# Patient Record
Sex: FEMALE | Race: WHITE | Employment: FULL TIME | URBAN - METROPOLITAN AREA
[De-identification: names, ages, dates, MRNs, and addresses within clinical notes are randomized per-mention and may not be internally consistent; named-entity substitution may affect disease eponyms.]

---

## 2022-10-15 ENCOUNTER — HOSPITAL ENCOUNTER (EMERGENCY)
Age: 32
Discharge: HOME | End: 2022-10-15
Payer: OTHER MISCELLANEOUS

## 2022-10-15 ENCOUNTER — APPOINTMENT (OUTPATIENT)
Dept: GENERAL RADIOLOGY | Age: 32
End: 2022-10-15
Payer: OTHER MISCELLANEOUS

## 2022-10-15 VITALS
OXYGEN SATURATION: 99 % | SYSTOLIC BLOOD PRESSURE: 116 MMHG | TEMPERATURE: 98 F | WEIGHT: 114 LBS | HEART RATE: 83 BPM | RESPIRATION RATE: 20 BRPM | DIASTOLIC BLOOD PRESSURE: 74 MMHG

## 2022-10-15 DIAGNOSIS — S61.219A LACERATION OF FINGER OF RIGHT HAND WITHOUT FOREIGN BODY WITHOUT DAMAGE TO NAIL, UNSPECIFIED FINGER, INITIAL ENCOUNTER: Primary | ICD-10-CM

## 2022-10-15 DIAGNOSIS — S61.219A LACERATION OF FINGER OF LEFT HAND WITHOUT FOREIGN BODY WITHOUT DAMAGE TO NAIL, UNSPECIFIED FINGER, INITIAL ENCOUNTER: ICD-10-CM

## 2022-10-15 PROCEDURE — 73130 X-RAY EXAM OF HAND: CPT

## 2022-10-15 PROCEDURE — 99283 EMERGENCY DEPT VISIT LOW MDM: CPT

## 2022-10-15 PROCEDURE — 90471 IMMUNIZATION ADMIN: CPT

## 2022-10-15 RX ORDER — CEPHALEXIN 500 MG/1
500 CAPSULE ORAL 4 TIMES DAILY
Qty: 40 CAPSULE | Refills: 0 | Status: SHIPPED | OUTPATIENT
Start: 2022-10-15 | End: 2022-10-25

## 2022-10-16 ENCOUNTER — TELEPHONE (OUTPATIENT)
Dept: CASE MANAGEMENT | Facility: HOSPITAL | Age: 32
End: 2022-10-16

## 2022-10-16 NOTE — PROGRESS NOTES
Patient left message stating [facility] did not get the prescription for her antibiotic. CM called [facility], [phone]. Verbally gave pharmacist, Rick Velázquez, the RX for Cefalexin. Pharmacy will call patient when its ready to be picked up.